# Patient Record
Sex: FEMALE | Race: WHITE | NOT HISPANIC OR LATINO | Employment: UNEMPLOYED | URBAN - METROPOLITAN AREA
[De-identification: names, ages, dates, MRNs, and addresses within clinical notes are randomized per-mention and may not be internally consistent; named-entity substitution may affect disease eponyms.]

---

## 2024-06-21 ENCOUNTER — HOSPITAL ENCOUNTER (EMERGENCY)
Facility: HOSPITAL | Age: 39
Discharge: HOME | End: 2024-06-22
Attending: EMERGENCY MEDICINE
Payer: MEDICAID

## 2024-06-21 DIAGNOSIS — S90.32XA CONTUSION OF LEFT FOOT, INITIAL ENCOUNTER: ICD-10-CM

## 2024-06-21 DIAGNOSIS — S20.219A CONTUSION OF CHEST WALL, UNSPECIFIED LATERALITY, INITIAL ENCOUNTER: Primary | ICD-10-CM

## 2024-06-21 PROCEDURE — 99283 EMERGENCY DEPT VISIT LOW MDM: CPT

## 2024-06-22 ENCOUNTER — APPOINTMENT (OUTPATIENT)
Dept: GENERAL RADIOLOGY | Facility: HOSPITAL | Age: 39
End: 2024-06-22
Payer: MEDICAID

## 2024-06-22 VITALS
RESPIRATION RATE: 18 BRPM | SYSTOLIC BLOOD PRESSURE: 107 MMHG | TEMPERATURE: 98.7 F | OXYGEN SATURATION: 99 % | DIASTOLIC BLOOD PRESSURE: 72 MMHG | BODY MASS INDEX: 32.62 KG/M2 | HEART RATE: 82 BPM | WEIGHT: 161.82 LBS | HEIGHT: 59 IN

## 2024-06-22 PROCEDURE — 73630 X-RAY EXAM OF FOOT: CPT

## 2024-06-22 PROCEDURE — 71046 X-RAY EXAM CHEST 2 VIEWS: CPT

## 2024-06-22 RX ORDER — NAPROXEN 375 MG/1
375 TABLET ORAL 2 TIMES DAILY PRN
Qty: 14 TABLET | Refills: 0 | Status: SHIPPED | OUTPATIENT
Start: 2024-06-22

## 2024-06-22 RX ORDER — TRAMADOL HYDROCHLORIDE 50 MG/1
50 TABLET ORAL ONCE
Status: COMPLETED | OUTPATIENT
Start: 2024-06-22 | End: 2024-06-22

## 2024-06-22 RX ADMIN — TRAMADOL HYDROCHLORIDE 50 MG: 50 TABLET ORAL at 01:22

## 2024-06-22 NOTE — ED PROVIDER NOTES
Subjective   History of Present Illness  Patient is a 38-year-old female complaint of pain to her chest and left foot after she had fallen.  She denies other complaint.      Review of Systems    No past medical history on file.    No Known Allergies    No past surgical history on file.    No family history on file.    Social History     Socioeconomic History    Marital status: Single           Objective   Physical Exam  Neck is nontender.  Lungs clear.  Chest is tender palpation over lateral chest wall bilaterally.  There is no crepitus or subcu air.  Abdomen soft.  EXTR exam shows mild pain of patient of her left foot with minimal swelling.  She has 2+ pulses throughout  Procedures           ED Course      XR Foot 3+ View Left    Result Date: 6/22/2024  Impression: No acute osseous abnormality. Electronically Signed: Kira Navarro MD  6/22/2024 12:39 AM EDT  Workstation ID: [id number]    XR Chest 2 View    Result Date: 6/22/2024  Impression: No acute cardiopulmonary process. Electronically Signed: Kira Navarro MD  6/22/2024 12:38 AM EDT  Workstation ID: [id number]                                          Medical Decision Making  Interpretation patient's left foot x-ray shows no fracture or dislocation.  X-ray chest shows no pneumothorax fracture or other acute abnormality.  Patient will be discharged with chest wall contusion and foot contusion.  She will be placed on Naprosyn and will see MD for recheck.    Amount and/or Complexity of Data Reviewed  Radiology: ordered and independent interpretation performed.    Risk  Prescription drug management.        Final diagnoses:   Contusion of chest wall, unspecified laterality, initial encounter   Contusion of left foot, initial encounter       ED Disposition  ED Disposition       ED Disposition   Discharge    Condition   Stable    Comment   --               No follow-up provider specified.       Medication List        New Prescriptions      naproxen 375 MG tablet  Commonly  known as: NAPROSYN  Take 1 tablet by mouth 2 (Two) Times a Day As Needed for Moderate Pain.               Where to Get Your Medications        Information about where to get these medications is not yet available    Ask your nurse or doctor about these medications  naproxen 375 MG tablet            Corey Tidwell MD  06/22/24 0111

## 2024-06-22 NOTE — ED NOTES
Pt c/o bilateral rib pain that radiates to her back. Pt states she fell two days ago and started having rib pain yesterday. Pt also states she hurt her left foot when she fell. Slight swelling noted below left great toe